# Patient Record
Sex: MALE
[De-identification: names, ages, dates, MRNs, and addresses within clinical notes are randomized per-mention and may not be internally consistent; named-entity substitution may affect disease eponyms.]

---

## 2019-04-11 ENCOUNTER — HOSPITAL ENCOUNTER (EMERGENCY)
Dept: HOSPITAL 42 - ED | Age: 54
Discharge: HOME | End: 2019-04-11
Payer: COMMERCIAL

## 2019-04-11 VITALS — RESPIRATION RATE: 16 BRPM | TEMPERATURE: 98.1 F

## 2019-04-11 VITALS — BODY MASS INDEX: 25.8 KG/M2

## 2019-04-11 VITALS — OXYGEN SATURATION: 97 % | DIASTOLIC BLOOD PRESSURE: 67 MMHG | HEART RATE: 90 BPM | SYSTOLIC BLOOD PRESSURE: 103 MMHG

## 2019-04-11 DIAGNOSIS — N39.0: ICD-10-CM

## 2019-04-11 DIAGNOSIS — N20.0: Primary | ICD-10-CM

## 2019-04-11 LAB
ALBUMIN SERPL-MCNC: 4.1 G/DL (ref 3–4.8)
ALBUMIN/GLOB SERPL: 1.4 {RATIO} (ref 1.1–1.8)
ALT SERPL-CCNC: 20 U/L (ref 7–56)
APPEARANCE UR: (no result)
APTT BLD: 30.9 SECONDS (ref 26.9–38.3)
AST SERPL-CCNC: 25 U/L (ref 17–59)
BACTERIA #/AREA URNS HPF: (no result) /HPF
BASOPHILS # BLD AUTO: 0.03 K/MM3 (ref 0–2)
BASOPHILS NFR BLD: 0.3 % (ref 0–3)
BILIRUB UR-MCNC: NEGATIVE MG/DL
BUN SERPL-MCNC: 12 MG/DL (ref 7–21)
CALCIUM SERPL-MCNC: 9.6 MG/DL (ref 8.4–10.5)
COLOR UR: YELLOW
EOSINOPHIL # BLD: 0.2 10*3/UL (ref 0–0.7)
EOSINOPHIL NFR BLD: 1.8 % (ref 1.5–5)
EPI CELLS #/AREA URNS HPF: (no result) /HPF (ref 0–5)
ERYTHROCYTE [DISTWIDTH] IN BLOOD BY AUTOMATED COUNT: 13.3 % (ref 11.5–14.5)
GFR NON-AFRICAN AMERICAN: > 60
GLUCOSE UR STRIP-MCNC: NEGATIVE MG/DL
HGB BLD-MCNC: 14.5 G/DL (ref 14–18)
INR PPP: 1.1
LEUKOCYTE ESTERASE UR-ACNC: (no result) LEU/UL
LIPASE SERPL-CCNC: 424 U/L (ref 23–300)
LYMPHOCYTES # BLD: 1.3 10*3/UL (ref 1.2–3.4)
LYMPHOCYTES NFR BLD AUTO: 13.3 % (ref 22–35)
MCH RBC QN AUTO: 29.6 PG (ref 25–35)
MCHC RBC AUTO-ENTMCNC: 33.7 G/DL (ref 31–37)
MCV RBC AUTO: 87.8 FL (ref 80–105)
MONOCYTES # BLD AUTO: 0.4 10*3/UL (ref 0.1–0.6)
MONOCYTES NFR BLD: 3.7 % (ref 1–6)
PH UR STRIP: 6 [PH] (ref 4.7–8)
PLATELET # BLD: 185 10^3/UL (ref 120–450)
PMV BLD AUTO: 9.9 FL (ref 7–11)
PROT UR STRIP-MCNC: NEGATIVE MG/DL
PROTHROMBIN TIME: 12.2 SECONDS (ref 9.4–12.5)
RBC # BLD AUTO: 4.9 10^6/UL (ref 3.5–6.1)
RBC # UR STRIP: (no result) /UL
RBC #/AREA URNS HPF: (no result) /HPF (ref 0–2)
SP GR UR STRIP: 1.01 (ref 1–1.03)
UROBILINOGEN UR STRIP-ACNC: 0.2 E.U./DL
WBC # BLD AUTO: 9.5 10^3/UL (ref 4.5–11)

## 2019-04-11 PROCEDURE — 99284 EMERGENCY DEPT VISIT MOD MDM: CPT

## 2019-04-11 PROCEDURE — 74177 CT ABD & PELVIS W/CONTRAST: CPT

## 2019-04-11 PROCEDURE — 87086 URINE CULTURE/COLONY COUNT: CPT

## 2019-04-11 PROCEDURE — 83690 ASSAY OF LIPASE: CPT

## 2019-04-11 PROCEDURE — 85025 COMPLETE CBC W/AUTO DIFF WBC: CPT

## 2019-04-11 PROCEDURE — 85730 THROMBOPLASTIN TIME PARTIAL: CPT

## 2019-04-11 PROCEDURE — 80053 COMPREHEN METABOLIC PANEL: CPT

## 2019-04-11 PROCEDURE — 96375 TX/PRO/DX INJ NEW DRUG ADDON: CPT

## 2019-04-11 PROCEDURE — 96374 THER/PROPH/DIAG INJ IV PUSH: CPT

## 2019-04-11 PROCEDURE — 81001 URINALYSIS AUTO W/SCOPE: CPT

## 2019-04-11 PROCEDURE — 83735 ASSAY OF MAGNESIUM: CPT

## 2019-04-11 PROCEDURE — 85610 PROTHROMBIN TIME: CPT

## 2019-04-11 NOTE — ED PDOC
Arrival/HPI





- General


Chief Complaint: Abdominal Pain


Time Seen by Provider: 04/11/19 03:13


Historian: Patient





- History of Present Illness


Narrative History of Present Illness (Text): 





04/11/19 03:41


53 year old male, allergic to penicillin, with no significant past medical 

history, presents to the emergency department with lower abdominal pain, since 1

hour.  Patient informs he feels pressure in his lower stomach.  Patient informs 

he does feel some chills associated with symptoms.  Patient informs he has 

urinated since symptom onset, but not moved his bowels.  Patient also informs of

some associated nausea, but no vomiting. Patient denies any fevers, headache, 

dizziness, chest pain, shortness of breath, cough, diaphoresis, diarrhea, back 

pain, neck pain, or any other complaint.


 


Time/Duration: 1 hour


Symptom Onset: Gradual


Symptom Course: Unchanged


Quality: Pressure


Activities at Onset: Light


Context: Home





Past Medical History





- Provider Review


Nursing Documentation Reviewed: Yes





- Pulmonary


Hx Respiratory Disorders: Yes


Hx Asthma: Yes





- Gastrointestinal


Hx Gastrointestinal Disorders: Yes


Other/Comment: inguinal hernia (double) - repaired 2014





- Psychiatric


Hx Psychophysiologic Disorder: No


Hx Substance Use: No





- Surgical History


Other/Comment: inguinal hernia repair 2014





- Anesthesia


Hx Anesthesia: Yes





Family/Social History





- Physician Review


Nursing Documentation Reviewed: Yes


Family/Social History: No Known Family HX


Smoking Status: Never Smoked


Hx Alcohol Use: No


Hx Substance Use: No





Allergies/Home Meds


Allergies/Adverse Reactions: 


Allergies





Penicillins Allergy (Intermediate, Verified 04/11/19 03:20)


   URTICARIA











Review of Systems





- Physician Review


All systems were reviewed & negative as marked: Yes





- Review of Systems


Constitutional: Other (Chills).  absent: Fevers


Respiratory: absent: SOB, Cough


Cardiovascular: absent: Chest Pain


Gastrointestinal: Abdominal Pain, Nausea.  absent: Diarrhea, Vomiting


Musculoskeletal: absent: Back Pain, Neck Pain


Neurological: absent: Headache, Dizziness


Endocrine: absent: Diaphoresis





Physical Exam


Vital Signs Reviewed: Yes





Vital Signs











  Temp Pulse Resp BP Pulse Ox


 


 04/11/19 03:22  98.1 F  59 L  16  129/76  100











Temperature: Afebrile


Blood Pressure: Normal


Pulse: Regular


Respiratory Rate: Normal


Appearance: Positive for: Well-Appearing, Non-Toxic, Comfortable


Pain Distress: None


Mental Status: Positive for: Alert and Oriented X 3





- Systems Exam


Head: Present: Atraumatic, Normocephalic


Pupils: Present: PERRL


Extroacular Muscles: Present: EOMI


Conjunctiva: Present: Normal


Mouth: Present: Moist Mucous Membranes


Neck: Present: Normal Range of Motion


Respiratory/Chest: Present: Clear to Auscultation, Good Air Exchange.  No: 

Respiratory Distress, Accessory Muscle Use


Cardiovascular: Present: Regular Rate and Rhythm, Normal S1, S2.  No: Murmurs


Abdomen: Present: Tenderness (Suprapubic tenderness, with pressure sensation).  

No: Distention, Peritoneal Signs


Back: Present: Normal Inspection


Upper Extremity: Present: Normal Inspection.  No: Cyanosis, Edema


Lower Extremity: Present: Normal Inspection.  No: Edema


Neurological: Present: GCS=15, CN II-XII Intact, Speech Normal


Skin: Present: Warm, Dry, Normal Color.  No: Rashes


Psychiatric: Present: Alert, Oriented x 3, Normal Insight, Normal Concentration





Medical Decision Making


ED Course and Treatment: 





04/11/19 03:47


Impression: 53 year old male presents with abdominal pain.





Plan:


-- CT ABD & Pelvis


-- Labs


-- Maalox


-- Donnaal


-- Toradol


-- Zofran


-- Urine culture


-- Urinalysis 


-- Reassess and disposition





Prior Visits:


Notes and results from previous visits were reviewed. 





Progress Notes:


04/11/19 04:56


Labs reviewed with elevated lipase of 424. No leukocytosis noted. Pending CT a/p

results.





04/11/19 05:42


UA reveals trace esterases. Ciprofloxacin ordered. Patient reevaluated and feels

better. CT a/p reveals moderate constipation and 3.5mm obstructing stone at the 

UPJ. Patient desires to go home. He is updated on lab and image findings and 

advised to follow up with a urologist. Opportunity for questions given and 

answered. Scripts given. He is stable for discharge. 








- Lab Interpretations


Lab Results: 














                                 04/11/19 04:00 





                                 04/11/19 04:00 





                                   Lab Results





04/11/19 04:00: Sodium 140, Potassium 3.8, Chloride 100, Carbon Dioxide 30, 

Anion Gap 14, BUN 12, Creatinine 1.0, Est GFR (African Amer) > 60, Est GFR (Non-

Af Amer) > 60, Random Glucose 174 H, Calcium 9.6, Magnesium 1.8, Total Bilirubin

0.5, AST 25, ALT 20, Alkaline Phosphatase 91, Total Protein 7.1, Albumin 4.1, 

Globulin 3.0, Albumin/Globulin Ratio 1.4, Lipase 424 H


04/11/19 04:00: PT 12.2, INR 1.10, APTT 30.9


04/11/19 04:00: WBC 9.5, RBC 4.90, Hgb 14.5, Hct 43.0, MCV 87.8, MCH 29.6, MCHC 

33.7, RDW 13.3, Plt Count 185, MPV 9.9, Neut % (Auto) 80.9 H, Lymph % (Auto) 

13.3 L, Mono % (Auto) 3.7, Eos % (Auto) 1.8, Baso % (Auto) 0.3, Lymph # (Auto) 

1.3, Mono # (Auto) 0.4, Eos # (Auto) 0.2, Baso # (Auto) 0.03, Absolute Neuts 

(auto) 7.68 H








I have reviewed the lab results: Yes





- RAD Interpretation


Narrative RAD Interpretations (Text): 





04/11/19 05:58


CT SCAN OF THE ABDOMEN AND PELVIS WITH CONTRAST.


CLINICAL HISTORY: Abdominal pain.


TECHNIQUE: Multiple axial and coronal CT images were obtained through the 

abdomen and pelvis after administration of intravenous contrast material.


COMMENTS:


3.5 mm obstructing stone of the right ureterovesical junction.


Mild right hydroureteronephrosis.


Diffuse thickening of the bladder which Secondary to underdistention, mild 

cystitis versus chronic bladder outlet obstruction changes. Correlation with 

urinalysis is suggested.


Moderate prostatomegaly.


Mild fullness of the left collecting system.


Uncomplicated colonic diverticulosis.


Moderate constipation.


Fluid-filled distended stomach, probably gastroparesis.


Diffuse thickening and enhancement of the wall of the gallbladder. Sonographic 

evaluation is suggested.


The liver is of uniform attenuation without mass or defect. There is no intra or

extrahepatic biliary ductal dilatation. The spleen is normal. 


The pancreas is of normal contour and attenuation characteristics. There is no 

evidence of adrenal mass.


Both kidneys demonstrate prompt and equal nephrograms. The kidneys are normal in

size, shape and configuration. There is no evidence of renal or ureteral mass. 


No evidence for appendicitis. There is no bowel wall thickening. No evidence for

small or large bowel obstruction. There is no evidence of abdominal ascites or 

lymphadenopathy.


There is no evidence of intrinsic or extrinsic bladder mass. There is no pelvic 

ascites or lymphadenopathy.


Images of the lung bases show no evidence of pleural or parenchymal mass. There 

are no pleural effusions. The bony structures are free of lytic or blastic 

lesions.


IMPRESSION: 


3.5 mm obstructing stone of the right ureterovesical junction.


Mild right hydroureteronephrosis.


Diffuse thickening of the bladder which Secondary to underdistention, mild 

cystitis versus chronic bladder outlet obstruction changes. Correlation with 

urinalysis is suggested.


Moderate prostatomegaly.


Mild fullness of the left collecting system.


Uncomplicated colonic diverticulosis.


Moderate constipation.


Fluid-filled distended stomach, probably gastroparesis.


Diffuse thickening and enhancement of the wall of the gallbladder. Sonographic 

evaluation is suggested.


: Radiologist





- Medication Orders


Current Medication Orders: 





04/11/19 04:59














Discontinued Medications





Al Hydrox/Mg Hydrox/Simethicone (Maalox Plus 30 Ml)  30 ml PO STAT STA


   Stop: 04/11/19 03:41


   Last Admin: 04/11/19 04:00  Dose: 30 ml





Belladonna/Phenobarbital (Donnatal Elixir)  10 ml PO STAT STA


   Stop: 04/11/19 03:41


   Last Admin: 04/11/19 04:08  Dose: 10 ml





Sodium Chloride (Sodium Chloride 0.9%)  1,000 mls @ 1,000 mls/hr IV .Q1H STA


   Stop: 04/11/19 04:39


   Last Admin: 04/11/19 03:55  Dose: 1,000 mls/hr





eMAR Start Stop


 Document     04/11/19 03:55  KV  (Rec: 04/11/19 04:00  KV  [id number])


     Intravenous Solution


      Start Date                                 04/11/19


      Start Time                                 03:55





Ketorolac Tromethamine (Toradol)  30 mg IVP STAT STA


   Stop: 04/11/19 03:41


   Last Admin: 04/11/19 04:01  Dose: 30 mg





MAR Pain Assessment


 Document     04/11/19 04:01  KV  (Rec: 04/11/19 04:01  KV  [id number])


     Pain Reassessment


      Is this a pain reassessment?               No


     Location


      Left, Right or Bilateral                   Bilateral


      Upper or Lower                             Lower


      Pain Location Body Site                    Abdomen


     Description


      Description                                Constant


      Intensity of Pain at present               7


IVP Administration


 Document     04/11/19 04:01  KV  (Rec: 04/11/19 04:01  KV  [id number])


     Charges for Administration


      # of IVP Administrations                   1





Ondansetron HCl (Zofran Inj)  4 mg IVP STAT STA


   Stop: 04/11/19 03:41


   Last Admin: 04/11/19 04:01  Dose: 4 mg





IVP Administration


 Document     04/11/19 04:01  KV  (Rec: 04/11/19 04:01  KV  [id number])


     Charges for Administration


      # of IVP Administrations                   1














- Scribe Statement


The provider has reviewed the documentation as recorded by the Scribe


Burt Lebron





Provider Scribe Attestation:


All medical record entries made by the Scribe were at my direction and 

personally dictated by me. I have reviewed the chart and agree that the record 

accurately reflects my personal performance of the history, physical exam, 

medical decision making, and the department course for this patient. I have also

personally directed, reviewed, and agree with the discharge instructions and 

disposition.











Disposition/Present on Arrival





- Present on Arrival


Any Indicators Present on Arrival: No


History of DVT/PE: No


History of Uncontrolled Diabetes: No


Urinary Catheter: No


History of Decub. Ulcer: No


History Surgical Site Infection Following: None





- Disposition


Have Diagnosis and Disposition been Completed?: Yes


Diagnosis: 


 Nephrolithiasis, UTI (urinary tract infection)





Disposition: HOME/ ROUTINE


Disposition Time: 05:47


Patient Plan: Discharge


Discharge Instructions (ExitCare):  Kidney Stones (DC), Urinary Tract Infection,

Adult (DC)


Print Language: ENGLISH


Additional Instructions: 





All medical record entries made by the Scribe were at my direction and 

personally dictated by me. I have reviewed the chart and agree that the record 

accurately reflects my personal performance of the history, physical exam, 

medical decision making, and the department course for this patient. I have also

personally directed, reviewed, and agree with the discharge instructions and 

disposition.





Please follow up with your PCP


Try to schedule an appointment with the urologist listed in your discharge 

paperwork


If your symptoms worsen(back/flank/abdominal pain, emesis, fevers), return to 

the ED 


Prescriptions: 


Ciprofloxacin [Cipro] 500 mg PO BID 5 Days #10 tab


Naproxen 500 mg PO BID #10 tab


Tamsulosin [Flomax] 0.4 mg PO DAILY #3 cap


Referrals: 


Romeo Syed MD [Staff Provider] - Follow up with primary


Corin Saldana MD [Medical Doctor] - Follow up with primary


[facility] [place] - Follow up with primary


Forms:  [employer] (English)

## 2019-04-11 NOTE — CT
Date of service: 



04/11/2019



PROCEDURE:  CT Abdomen and Pelvis with contrast



HISTORY:

abdominal pain



COMPARISON:

None.



TECHNIQUE:

Following the intravenous administration of iodinated contrast 

material, a CT examination of the abdomen and pelvis was performed 

from the domes of the diaphragms to the symphysis pubis with 

reformatted datasets provided in axial, sagittal and coronal planes. 

Oral contrast was not administered as per referring physician request.



Contrast dose: Omnipaque 350, 100 cc



Radiation dose:



Total exam DLP = 856.51 mGy-cm.



This CT exam was performed using one or more of the following dose 

reduction techniques: Automated exposure control, adjustment of the 

mA and/or kV according to patient size, and/or use of iterative 

reconstruction technique.



FINDINGS:



LOWER THORAX:

Unremarkable. 



LIVER:

Mild periportal edema is appreciate which is of uncertain clinical 

significance and origin.  No hepatic mass or definite intrahepatic 

biliary dilatation identified. 



GALLBLADDER AND BILE DUCTS:

Gallbladder wall is mildly thickened with irregular margins in 

borderline pericholecystic fluid.  No radiodense choledocholithiasis. 

 Common bile duct appears normal caliber.  Clinically correlate for 

potential acalculous cholecystitis.  Potential duodenal diverticulum 

adjacent to the proximal to mid CBD. 



PANCREAS:

Unremarkable. No gross lesion or ductal dilatation.



SPLEEN:

Unremarkable. 



ADRENALS:

Unremarkable. No mass. 



KIDNEYS AND URETERS:

There is mild right hydronephrosis and limited right hydroureter with 

a small 3 mm calculus identified either within the lumen of the 

urinary bladder toward the right or possibly at the right UV 

junction. This may indicate residual hydronephrosis from expelled 

calculus or persistent but mild right-sided obstructive uropathy. No 

radiodense urolithiasis seen the left kidney or obstructive uropathy. 

No significant perinephric reaction at either kidney. Clinically 

correlate further. Nephrograms appear symmetric. 



VASCULATURE:

Unremarkable. No aortic aneurysm. No aortic atherosclerotic 

calcification or mural plaque present.



BOWEL:

Moderate fecal loading is seen throughout the majority colon and 

there is extensive sigmoid diverticulosis without diverticulitis. No 

obstruction. No gross mural thickening. 



APPENDIX:

No CT evidence of appendicitis. 



PERITONEUM:

Trace fluid seen the inferior right pelvis of uncertain origin. 

Perineum otherwise unremarkable no free intra peritoneal gas 

collection or peritoneal abscess appreciated. 



LYMPH NODES:

Unremarkable. No enlarged lymph nodes. 



BLADDER:

Unremarkable. 



REPRODUCTIVE:

Mild-to-moderate prostate gland enlargement. 



BONES:

No acute fracture. 



OTHER FINDINGS:

None.



IMPRESSION:

1. Irregular mural thickening the gallbladder without radiodense 

cholelithiasis or biliary tree dilatation may indicate acalculous 

cholecystitis. Clinically correlate. No radiodense 

choledocholithiasis. 



2. Mild right hydro nephrosis and hydroureter are suspected either is 

residual from expelled calculus into the urinary bladder rule lumen 

or from 3 mm calculus at the right ureterovesical junction with 

persistent limited hydronephrosis identified. Further clinical 

correlation advised. 



3. Trace fluid inferior right pelvis of uncertain origin. 



4. Extensive sigmoid diverticulosis without diverticulitis. 



5. Mild-to-moderate prostate gland enlargement. 



Preliminary report provided by Angle, 04/11/2019, 5:33 a.m..